# Patient Record
Sex: MALE | Race: OTHER | HISPANIC OR LATINO | Employment: FULL TIME | URBAN - METROPOLITAN AREA
[De-identification: names, ages, dates, MRNs, and addresses within clinical notes are randomized per-mention and may not be internally consistent; named-entity substitution may affect disease eponyms.]

---

## 2019-11-22 ENCOUNTER — APPOINTMENT (EMERGENCY)
Dept: CT IMAGING | Facility: HOSPITAL | Age: 29
End: 2019-11-22
Payer: COMMERCIAL

## 2019-11-22 ENCOUNTER — HOSPITAL ENCOUNTER (EMERGENCY)
Facility: HOSPITAL | Age: 29
Discharge: HOME | End: 2019-11-22
Attending: EMERGENCY MEDICINE
Payer: COMMERCIAL

## 2019-11-22 VITALS
WEIGHT: 220.1 LBS | DIASTOLIC BLOOD PRESSURE: 89 MMHG | SYSTOLIC BLOOD PRESSURE: 148 MMHG | OXYGEN SATURATION: 99 % | RESPIRATION RATE: 19 BRPM | TEMPERATURE: 98.4 F | HEART RATE: 84 BPM

## 2019-11-22 DIAGNOSIS — M25.552 ACUTE HIP PAIN, LEFT: ICD-10-CM

## 2019-11-22 DIAGNOSIS — G89.29 ACUTE EXACERBATION OF CHRONIC LOW BACK PAIN: ICD-10-CM

## 2019-11-22 DIAGNOSIS — M54.50 ACUTE EXACERBATION OF CHRONIC LOW BACK PAIN: ICD-10-CM
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
## 2019-11-22 PROCEDURE — 73700 CT LOWER EXTREMITY W/O DYE: CPT

## 2019-11-22 PROCEDURE — 99284 EMERGENCY DEPT VISIT MOD MDM: CPT | Performed by: PHYSICIAN ASSISTANT

## 2019-11-22 PROCEDURE — 96372 THER/PROPH/DIAG INJ SC/IM: CPT

## 2019-11-22 PROCEDURE — 72131 CT LUMBAR SPINE W/O DYE: CPT

## 2019-11-22 PROCEDURE — 99284 EMERGENCY DEPT VISIT MOD MDM: CPT

## 2019-11-22 RX ORDER — ACETAMINOPHEN 325 MG/1
650 TABLET ORAL ONCE
Status: COMPLETED | OUTPATIENT
Start: 2019-11-22 | End: 2019-11-22

## 2019-11-22 RX ORDER — KETOROLAC TROMETHAMINE 30 MG/ML
15 INJECTION, SOLUTION INTRAMUSCULAR; INTRAVENOUS ONCE
Status: COMPLETED | OUTPATIENT
Start: 2019-11-22 | End: 2019-11-22

## 2019-11-22 RX ORDER — IBUPROFEN 400 MG/1
400 TABLET ORAL EVERY 6 HOURS PRN
Qty: 20 TABLET | Refills: 0 | Status: SHIPPED | OUTPATIENT
Start: 2019-11-22

## 2019-11-22 RX ORDER — METHOCARBAMOL 500 MG/1
500 TABLET, FILM COATED ORAL ONCE
Status: COMPLETED | OUTPATIENT
Start: 2019-11-22 | End: 2019-11-22

## 2019-11-22 RX ORDER — LIDOCAINE 50 MG/G
1 PATCH TOPICAL ONCE
Status: DISCONTINUED | OUTPATIENT
Start: 2019-11-22 | End: 2019-11-22 | Stop reason: HOSPADM

## 2019-11-22 RX ORDER — METHOCARBAMOL 500 MG/1
500 TABLET, FILM COATED ORAL 2 TIMES DAILY
Qty: 20 TABLET | Refills: 0 | Status: SHIPPED | OUTPATIENT
Start: 2019-11-22

## 2019-11-22 RX ORDER — ACETAMINOPHEN 500 MG
500 TABLET ORAL EVERY 6 HOURS PRN
Qty: 30 TABLET | Refills: 0 | Status: SHIPPED | OUTPATIENT
Start: 2019-11-22

## 2019-11-22 RX ADMIN — KETOROLAC TROMETHAMINE 15 MG: 30 INJECTION, SOLUTION INTRAMUSCULAR; INTRAVENOUS at 17:43

## 2019-11-22 RX ADMIN — LIDOCAINE 1 PATCH: 50 PATCH TOPICAL at 18:18

## 2019-11-22 RX ADMIN — METHOCARBAMOL TABLETS 500 MG: 500 TABLET, COATED ORAL at 17:43

## 2019-11-22 RX ADMIN — ACETAMINOPHEN 650 MG: 325 TABLET ORAL at 17:43

## 2019-11-22 NOTE — ED PROVIDER NOTES
History  Chief Complaint   Patient presents with    Back Pain     Stepped wrong going down stairs yesterday  Left sided back pain down LLE  Denies numbness/tingling  States he did not fall, body twisted  Usually has pain, "but it is 10x's worse than normal"  Took ibuprofen at 0930, no relief  Limping from triage to room  Patient is a 79-year-old male presents today with chief complaint of low back pain which radiates into his left hip  Patient reports he fell down multiple stairs earlier today  Patient denies any head or neck pain or head trauma and notes he is not on any blood thinning medications  Patient reports he was seeing a specialist in [place] and was told that he may have curvature of the spine or potential disc problems however reports he is unsure of the previous imaging results as he moved to [facility]  History provided by:  Patient   used: No    Back Pain   Location:  Lumbar spine  Quality:  Aching  Radiates to:  L thigh  Pain severity:  Severe  Pain is:  Same all the time  Onset quality:  Gradual  Duration:  6 hours  Timing:  Constant  Progression:  Worsening  Chronicity:  New  Context: recent injury    Relieved by:  None tried  Worsened by:  Nothing  Ineffective treatments:  None tried  Associated symptoms: no abdominal pain, no chest pain, no dysuria, no fever and no numbness        None       History reviewed  No pertinent past medical history  History reviewed  No pertinent surgical history  History reviewed  No pertinent family history  I have reviewed and agree with the history as documented  Social History     Tobacco Use    Smoking status: Never Smoker    Smokeless tobacco: Never Used   Substance Use Topics    Alcohol use: Not Currently    Drug use: Never        Review of Systems   Constitutional: Negative for chills, fatigue and fever  HENT: Negative for congestion, ear pain, rhinorrhea and sore throat  Eyes: Negative for redness  Respiratory: Negative for chest tightness and shortness of breath  Cardiovascular: Negative for chest pain and palpitations  Gastrointestinal: Negative for abdominal pain, nausea and vomiting  Genitourinary: Negative for dysuria and hematuria  Musculoskeletal: Positive for arthralgias and back pain  Skin: Negative for rash  Neurological: Negative for dizziness, syncope, light-headedness and numbness  Physical Exam  Physical Exam   Constitutional: He is oriented to person, place, and time  He appears well-developed and well-nourished  HENT:   Head: Normocephalic and atraumatic  Eyes: Pupils are equal, round, and reactive to light  Neck: Normal range of motion  Cardiovascular: Normal rate, regular rhythm and normal heart sounds  Pulmonary/Chest: Effort normal and breath sounds normal    Abdominal: Soft  There is no tenderness  There is no guarding  Musculoskeletal: He exhibits tenderness  He exhibits no edema or deformity  Back:    Lymphadenopathy:     He has no cervical adenopathy  Neurological: He is alert and oriented to person, place, and time  Skin: Skin is warm and dry  Capillary refill takes less than 2 seconds  Psychiatric: He has a normal mood and affect  Nursing note and vitals reviewed        Vital Signs  ED Triage Vitals [11/22/19 1726]   Temperature Pulse Respirations Blood Pressure SpO2   98 4 °F (36 9 °C) 84 19 148/89 99 %      Temp Source Heart Rate Source Patient Position - Orthostatic VS BP Location FiO2 (%)   Tympanic Monitor Sitting Left arm --      Pain Score       Worst Possible Pain           Vitals:    11/22/19 1726   BP: 148/89   Pulse: 84   Patient Position - Orthostatic VS: Sitting         Visual Acuity      ED Medications  Medications   lidocaine (LIDODERM) 5 % patch 1 patch (1 patch Topical Medication Applied 11/22/19 1818)   ketorolac (TORADOL) injection 15 mg (15 mg Intramuscular Given 11/22/19 1743)   acetaminophen (TYLENOL) tablet 650 mg (650 mg Oral Given 11/22/19 1743)   methocarbamol (ROBAXIN) tablet 500 mg (500 mg Oral Given 11/22/19 1743)       Diagnostic Studies  Results Reviewed     None                 CT spine lumbar without contrast   Final Result by Lu Jasso MD (11/22 1851)      Mild multilevel degenerative facet arthrosis      No evidence of disc herniation, central canal or foraminal stenosis      No acute lumbosacral sacral pathology identified            Workstation performed: [id number]         CT hip left without contrast   Final Result by Lu Jasso MD (11/22 1849)      Moderate degenerative arthritis left hip      No evidence of acute fracture            Workstation performed: [id number]                    Procedures  Procedures       ED Course  ED Course as of Nov 22 1915 Fri Nov 22, 2019   1854    Mild multilevel degenerative facet arthrosis     No evidence of disc herniation, central canal or foraminal stenosis     No acute lumbosacral sacral pathology identified         1854 IMPRESSION:     Moderate degenerative arthritis left hip     No evidence of acute fracture                                        MDM  Number of Diagnoses or Management Options  Acute exacerbation of chronic low back pain:   Acute hip pain, left:   Fall, in, on, stairs, initial encounter:   Diagnosis management comments: Denies history of severe or worsening lower extremity weakness/numbness  Denies history of saddle anesthesia/perineal anesthesia  Denies bowel or bladder incontinence/retention   History does not suggest diagnosis of cauda equina syndrome   Patient denies history of IVDA, denies history of fevers, no recent surgeries or any procedures to suggest a transient bacteremia leading to a diagnosis of subdural abscess  Denies history of blood thinner use with recent history of lumbar puncture or any violation of the epidural space to suggest history of epidural hematoma   Therefore these above diagnoses (cauda equina syndrome, epidural abscess, epidural hematoma) were not pursued with diagnostic imaging  Due to acute trauma       Disposition  Final diagnoses:   Fall, in, on, stairs, initial encounter   Acute exacerbation of chronic low back pain   Acute hip pain, left     Time reflects when diagnosis was documented in both MDM as applicable and the Disposition within this note     Time User Action Codes Description Comment    11/22/2019  6:55 PM Marino Mckeon [W10  9XXA] Fall, in, on, stairs, initial encounter     11/22/2019  6:56 PM Cindy Nirmala Add [M54 5,  G89 29] Acute exacerbation of chronic low back pain     11/22/2019  6:57 PM Cindy Nirmala Add [M25 552] Acute hip pain, left       ED Disposition     ED Disposition Condition Date/Time Comment    Discharge Good Fri Nov 22, 2019  6:55 PM Cesar Howell discharge to home/self care  Follow-up Information     Follow up With Specialties Details Why Contact Info Additional Information    [facility] Therapy Schedule an appointment as soon as possible for a visit  As needed           Discharge Medication List as of 11/22/2019  6:57 PM      START taking these medications    Details   acetaminophen (TYLENOL) 500 mg tablet Take 1 tablet (500 mg total) by mouth every 6 (six) hours as needed for mild pain, Starting Fri 11/22/2019, Print      ibuprofen (MOTRIN) 400 mg tablet Take 1 tablet (400 mg total) by mouth every 6 (six) hours as needed for moderate pain, Starting Fri 11/22/2019, Print      methocarbamol (ROBAXIN) 500 mg tablet Take 1 tablet (500 mg total) by mouth 2 (two) times a day, Starting Fri 11/22/2019, Print           No discharge procedures on file      ED Provider  Electronically Signed by           Marjan Chapa PA-C  11/22/19 Stevie Rose PA-C  11/22/19 0060

## 2019-11-23 NOTE — ED ATTENDING ATTESTATION
I was the attending physician on duty at the time the patient visited the emergency department  The patient was evaluated and dispositioned by the APC  I was personally available for consultation  I am administratively signing the chart after the fact      Pal Lopez MD

## 2022-10-08 ENCOUNTER — APPOINTMENT (OUTPATIENT)
Dept: URGENT CARE | Facility: MEDICAL CENTER | Age: 32
End: 2022-10-08
Payer: OTHER MISCELLANEOUS

## 2022-10-08 PROCEDURE — 99284 EMERGENCY DEPT VISIT MOD MDM: CPT | Performed by: FAMILY MEDICINE

## 2022-10-08 PROCEDURE — G0383 LEV 4 HOSP TYPE B ED VISIT: HCPCS | Performed by: FAMILY MEDICINE

## 2022-10-13 ENCOUNTER — APPOINTMENT (OUTPATIENT)
Dept: URGENT CARE | Facility: MEDICAL CENTER | Age: 32
End: 2022-10-13
Payer: OTHER MISCELLANEOUS

## 2022-10-13 PROCEDURE — 99213 OFFICE O/P EST LOW 20 MIN: CPT | Performed by: FAMILY MEDICINE

## 2022-10-31 ENCOUNTER — APPOINTMENT (OUTPATIENT)
Dept: URGENT CARE | Facility: MEDICAL CENTER | Age: 32
End: 2022-10-31

## 2023-03-02 ENCOUNTER — OFFICE VISIT (OUTPATIENT)
Dept: DENTISTRY | Facility: CLINIC | Age: 33
End: 2023-03-02

## 2023-03-02 VITALS — SYSTOLIC BLOOD PRESSURE: 126 MMHG | DIASTOLIC BLOOD PRESSURE: 81 MMHG | HEART RATE: 100 BPM | TEMPERATURE: 97.8 F

## 2023-03-02 DIAGNOSIS — Z01.20 ENCOUNTER FOR DENTAL EXAMINATION: Primary | ICD-10-CM

## 2023-03-02 PROBLEM — K05.30 PERIODONTITIS: Status: ACTIVE | Noted: 2023-03-02

## 2023-03-02 NOTE — DENTAL PROCEDURE DETAILS
Malarocio Stanton presents for a Comprehensive exam  Verbal consent for treatment given in addition to the forms  Reviewed health history - Patient is ASA I  Consents signed: Yes     Perio: Normal, Localized #sextant 5 , and Gingival inflammation # 82,16,19,45 anterior crowding (pre-molar light sub calculus)   Pain Scale: 0  Caries Assessment: Medium  Radiographs: Complete mouth series  Stage 1 Grade B      Oral Hygiene instruction reviewed and given  Recommended Hygiene recall visits with the Kemal Renata  Its been many years since he's seen a dentist anterior crowding w/moderate supra calculus, and facial margin #3 & #14, patient is missing #9 from basketball accident many years ago and is interested in upper partial      Treatment Plan:  1  Infection control:   2  Periodontal therapy: adult prophy  3  Caries control: as charted  4  Occlusal evaluation: Pre-auth partial denture to replace #9   5  Case Difficulty Type 1  Prognosis is Good  Referrals needed:  To [facility] for extraction #1 & #16 after prophy appointment   Next Visit:  Prophy   Exam by Dr Eve Delatorre

## 2023-03-09 ENCOUNTER — OFFICE VISIT (OUTPATIENT)
Dept: DENTISTRY | Facility: CLINIC | Age: 33
End: 2023-03-09

## 2023-03-09 VITALS — HEART RATE: 98 BPM | DIASTOLIC BLOOD PRESSURE: 86 MMHG | SYSTOLIC BLOOD PRESSURE: 125 MMHG | TEMPERATURE: 98.3 F

## 2023-03-09 DIAGNOSIS — Z01.20 ENCOUNTER FOR DENTAL EXAMINATION: Primary | ICD-10-CM

## 2023-03-09 NOTE — DENTAL PROCEDURE DETAILS
Prophylaxis completed with ultrasonic  and hand instrumentation  Soft plaque removed and supragingival calculus removed from entire dentition,  especially sextant 5 & facial margins #3 & #14 patient has anterior crown   Polished with prophy cup and paste  Flossed and provided Oral Health Instructions  Demonstrated proper brushing and flossing technique  Patient left satisfied and ambulatory      NV: 1 Restorative #12 & #15 Occlusal   NV: 2 [address]     No Exam today

## 2023-03-24 ENCOUNTER — APPOINTMENT (OUTPATIENT)
Dept: URGENT CARE | Facility: MEDICAL CENTER | Age: 33
End: 2023-03-24

## 2023-04-06 ENCOUNTER — APPOINTMENT (OUTPATIENT)
Dept: RADIOLOGY | Facility: MEDICAL CENTER | Age: 33
End: 2023-04-06

## 2023-04-06 DIAGNOSIS — M25.552 LEFT HIP PAIN: ICD-10-CM

## 2023-06-07 ENCOUNTER — OFFICE VISIT (OUTPATIENT)
Dept: DENTISTRY | Facility: CLINIC | Age: 33
End: 2023-06-07

## 2023-06-07 VITALS — HEART RATE: 87 BPM | TEMPERATURE: 98 F | DIASTOLIC BLOOD PRESSURE: 91 MMHG | SYSTOLIC BLOOD PRESSURE: 137 MMHG

## 2023-06-07 DIAGNOSIS — K02.9 DENTAL CARIES: Primary | ICD-10-CM

## 2023-06-07 PROCEDURE — D2391 RESIN-BASED COMPOSITE - 1 SURFACE, POSTERIOR: HCPCS | Performed by: DENTIST

## 2023-06-07 NOTE — DENTAL PROCEDURE DETAILS
Patient due for next hygiene recall Sept 2023  [facility], [facility], ASA 1 - Normal health patient  Patient reports pain level of 0  Patient presents for restorative treatment #12-O, 15-O   EOE WNL  IOE shows no swelling or sinus tracts  Anesthesia: 0 75 carpules Articaine, 4% with Epinephrine 1:100,000, given via buccal Infiltration  Isolation: Cotton roll isolation achieved  Tx:  Primary caries removed  No matrix used  Selective etched for 12 seconds with 37% phosphoric acid and rinsed, Ivoclar Adhese Universal bond placed with AVOS SystemsaPen 20 second scrub, air dried for 5 seconds and light cured, and restored with Tetric Evoflow and Evoceram composite shade A2  Occlusion checked with articulation paper and Margins checked with explorer  Adjusted as needed  Finished and polished  Patient satisfied and dismissed alert and ambulatory  Behavior ++, very good for injection      NV: Restorative #18-O

## 2023-06-08 ENCOUNTER — OFFICE VISIT (OUTPATIENT)
Dept: DENTISTRY | Facility: CLINIC | Age: 33
End: 2023-06-08

## 2023-06-08 VITALS — DIASTOLIC BLOOD PRESSURE: 91 MMHG | SYSTOLIC BLOOD PRESSURE: 139 MMHG | HEART RATE: 85 BPM | TEMPERATURE: 98 F

## 2023-06-08 DIAGNOSIS — K02.9 DENTAL CARIES: Primary | ICD-10-CM

## 2023-06-08 PROCEDURE — D2391 RESIN-BASED COMPOSITE - 1 SURFACE, POSTERIOR: HCPCS | Performed by: DENTIST

## 2023-06-08 NOTE — DENTAL PROCEDURE DETAILS
Patient due for next hygiene recall Sept 2023  [facility], [facility], ASA 1 - Normal health patient  Patient reports pain level of 0  Patient presents for restorative treatment #31-O   EOE WNL  IOE shows no swelling or sinus tracts  Anesthesia: 1 0 carpule Lidocaine HCL, 2% with Epinephrine 1:100,000, given via IANB  Isolation: Size Medium Dryshield Isolation achieved  Tx:  Primary caries removed, decay deeper than expected based on radiograph in mesial portion of occlusal prep  No matrix used  Selective etched for 12 seconds with 37% phosphoric acid and rinsed, Hema-Glu desensitizer applied with microbrush for 30 seconds then rinsed and lightly air dried, Ivoclar Adhese Universal bond placed with VivaPen 20 second scrub, air dried for 5 seconds and light cured, and restored with Tetric Evoceram composite shade A2  Occlusion checked with articulation paper and Margins checked with explorer  Adjusted as needed  Finished and polished  Patient satisfied and dismissed alert and ambulatory  Behavior ++, very good for injection  NV: Restorative and discuss plan for 3rd molars

## 2023-06-13 ENCOUNTER — OFFICE VISIT (OUTPATIENT)
Dept: DENTISTRY | Facility: CLINIC | Age: 33
End: 2023-06-13

## 2023-06-13 VITALS — HEART RATE: 86 BPM | DIASTOLIC BLOOD PRESSURE: 92 MMHG | SYSTOLIC BLOOD PRESSURE: 137 MMHG

## 2023-06-13 DIAGNOSIS — Z01.21 ENCOUNTER FOR DENTAL EXAMINATION AND CLEANING WITH ABNORMAL FINDINGS: Primary | ICD-10-CM

## 2023-06-13 PROCEDURE — D2392 RESIN-BASED COMPOSITE - 2 SURFACES, POSTERIOR: HCPCS | Performed by: DENTIST

## 2023-06-13 NOTE — PROGRESS NOTES
Composite Filling    Aba Reyes presents for composite filling  PMH reviewed, no changes  Discussed with patient need for RCT if pulp exposure occurs or in future if pulp is inflamed  Pt understands and consents  Prepped tooth # 18 (OB), no anesthesia needed  with 245 carbide on high speed  Caries removed with round carbide on slow speed    Isolation with cotton rolls and dri-angles    Etch with 37% H2PO4, rinse, dry  Applied Adhese with 20 second scrub once, gentle air dry and light cured for 10s  Restored with Tetric bulk tomeka shade A2 and light cured  Refined with finishing burs, polished with enhance point  Verified occlusion and contacts  post op instructions given  Pt left satisfied  NV : discussing and treatment planning of the missing upper anterior tooth # 9

## 2023-06-13 NOTE — DENTAL PROCEDURE DETAILS
Patient presents for a dental restoration and verbally consents for treatment:  Reviewed health history-  Pt is ASA type II  Treatment consents signed: Yes  Perio: Periodontitis  Pain Scale: 0  Caries Assessment: Medium    Radiographs: Films are current  Oral Hygiene instruction reviewed and given  Hygiene recall visits recommended to the patient    Patient agrees with the diagnosis of Caries and the proposed treatment plan for the resin restoration:  Tooth ##18  Dental Anesthesia:  No  Material:   Etch Ivoclar bond and resin   Shade: Shade A2    Prognosis is Good  Referrals Needed: No  Next visit: treatment planning for missing Up  Ant  tooth

## 2023-07-12 ENCOUNTER — OFFICE VISIT (OUTPATIENT)
Dept: DENTISTRY | Facility: CLINIC | Age: 33
End: 2023-07-12

## 2023-07-12 VITALS — SYSTOLIC BLOOD PRESSURE: 128 MMHG | TEMPERATURE: 98 F | DIASTOLIC BLOOD PRESSURE: 88 MMHG | HEART RATE: 91 BPM

## 2023-07-12 DIAGNOSIS — K08.409 TOOTH MISSING: Primary | ICD-10-CM

## 2023-07-12 PROCEDURE — WIS5000 PRELIMINARY IMPRESSIONS: Performed by: DENTIST

## 2023-07-12 NOTE — DENTAL PROCEDURE DETAILS
Patient presents for starting procedure of removable maxillary acrylic base partial denture to replace missing tooth #9. Patient consent treatment verbally. Reviewed medical history, no changes. ASA: I  Pain score: 0    Reviewed and discussed with patient expectations and steps for denture procedures. Patient aware, understood and approved. Patient reported he might think to have an implant in future. Upper and lower preliminary impressions for diagnostic models. Postop instruction is given to patient. Patient walked out comfortable and ambulatory. NV: Final Impression of upper acrylic partial denture/tooth shade selection.

## 2023-08-02 ENCOUNTER — OFFICE VISIT (OUTPATIENT)
Dept: DENTISTRY | Facility: CLINIC | Age: 33
End: 2023-08-02

## 2023-08-02 VITALS — HEART RATE: 91 BPM | DIASTOLIC BLOOD PRESSURE: 81 MMHG | TEMPERATURE: 97.6 F | SYSTOLIC BLOOD PRESSURE: 115 MMHG

## 2023-08-02 DIAGNOSIS — K08.409 TOOTH MISSING: Primary | ICD-10-CM

## 2023-08-02 PROCEDURE — WIS5001 FINAL IMPRESSIONS DENTURE: Performed by: DENTIST

## 2023-08-02 NOTE — DENTAL PROCEDURE DETAILS
Pt presents for a denture visit and gave verbal consent for treatment:    Reviewed health history - no changes. Pt is ASA Class I    Pain Scale: 0    Chief complain: "I need partial to replace my missing front tooth". Caries Assessment: Low    Radiographs: Films are current    Oral Hygiene instruction reviewed    Routine prophy visits recommended to the pt. Explained denture making process to patient, including the necessary number of visits and timeframe to make denture. Reviewed denture expectations, adjustment period, and hygiene. Tried custom tray, border molding and final impression is taken for maxillary acrylic base partial denture using PVS medium and light body. Bite registration is recorded with blue print. Tooth shade is A2 and gum shade is standard, patient approved. Work is sent to lab with lower diagnostic model from last visit for articulation and wax teeth set up.     NV: Try in wax teeth set up of upper acrylic base partial denture

## 2023-08-18 ENCOUNTER — OFFICE VISIT (OUTPATIENT)
Dept: DENTISTRY | Facility: CLINIC | Age: 33
End: 2023-08-18

## 2023-08-18 VITALS — DIASTOLIC BLOOD PRESSURE: 90 MMHG | HEART RATE: 88 BPM | SYSTOLIC BLOOD PRESSURE: 127 MMHG

## 2023-08-18 DIAGNOSIS — K08.109 MISSING TEETH, ACQUIRED: Primary | ICD-10-CM

## 2023-08-18 PROCEDURE — WIS5003 WAX TRY-IN

## 2023-08-18 NOTE — DENTAL PROCEDURE DETAILS
Patient presents for a wax try-in and verbally consents for treatment:  Reviewed health history-  Pt is ASA type I  Treatment consents signed: Yes  Perio: Healthy  Pain Scale: 0  Caries Assessment: Medium    Radiographs: Films are current  Oral Hygiene instruction reviewed and given  Hygiene recall visits recommended to the patient    Tooth set up tried intraorally. Confirmed proper tissue support and occlusion. Pt confirmed satisfaction with function and esthetics via pt mirror. Selected gingival shade (standard) and confirmed by pt. Not fully seating in arch. In lab slip, asked lab to add wrought wire clasps around the buccal of 3 and 14 to increase retention. Pt left satisfied and ambulatory.      NV: delivery  Attending: Dr. Antonia Roberts

## 2023-09-13 ENCOUNTER — OFFICE VISIT (OUTPATIENT)
Dept: DENTISTRY | Facility: CLINIC | Age: 33
End: 2023-09-13

## 2023-09-13 VITALS — HEART RATE: 84 BPM | SYSTOLIC BLOOD PRESSURE: 135 MMHG | DIASTOLIC BLOOD PRESSURE: 92 MMHG

## 2023-09-13 DIAGNOSIS — Z01.20 ENCOUNTER FOR DENTAL EXAMINATION: Primary | ICD-10-CM

## 2023-09-13 PROCEDURE — D0120 PERIODIC ORAL EVALUATION - ESTABLISHED PATIENT: HCPCS

## 2023-09-13 PROCEDURE — D1110 PROPHYLAXIS - ADULT: HCPCS

## 2023-09-13 NOTE — DENTAL PROCEDURE DETAILS
Eric Barron presents for a Periodic exam. Verbal consent for treatment given in addition to the forms. Reviewed health history - Patient is ASA I  Consents signed: Yes     Perio: Normal  Pain Scale: 0  Caries Assessment: Low  Radiographs: None     Oral Hygiene instruction reviewed and given. Recommended Hygiene recall visits with the Robe Kaur. Patient presents for [facility], no chief complaints today #9 max partial denture is in the process. Moderate facial calculus #3 & #14 used cavitron , w/moderate bleeding. Treatment Plan:  1. Infection control:   2. Adult Prophy   3. Caries control: as charted  4. Occlusal evaluation:   5. Case Difficulty Type 1  Prognosis is Good.   Referrals needed: No  Next Visit: Follow up w/max partial denture process  NV: 2 [facility] w/BW   Exam by Dr. Breezy Ge

## 2023-09-18 ENCOUNTER — OFFICE VISIT (OUTPATIENT)
Dept: DENTISTRY | Facility: CLINIC | Age: 33
End: 2023-09-18

## 2023-09-18 VITALS — SYSTOLIC BLOOD PRESSURE: 140 MMHG | TEMPERATURE: 98.6 F | DIASTOLIC BLOOD PRESSURE: 99 MMHG | HEART RATE: 77 BPM

## 2023-09-18 DIAGNOSIS — K08.409 TOOTH MISSING: Primary | ICD-10-CM

## 2023-09-18 PROCEDURE — D5211 MAXILLARY PARTIAL DENTURE - RESIN BASE (INCLUDING, RETENTIVE/CLASPING MATERIALS, RESTS, AND TEETH): HCPCS | Performed by: DENTIST

## 2023-09-18 NOTE — DENTAL PROCEDURE DETAILS
Delivery of maxillary Acrylic Base partial Denture    Patient Parmar Tyra) presents for delivery of maxillary Acrylic Base partial Denture. Patient consent treatment. RMH, no changes. ASA: I  Pain score: 0  Chief complain: none. Procedure: Upper acrylic base partial denture is delivered to patient. Verified seating, retention, stability, occlusion, phonetics and esthetics. Patient tried it by himself and approved. Demonstrated how to clean and maintain the denture. Denture care kit is given to patient. Post insertion instruction is given to patient. Patient is very happy, satisfied and left ambulatory. NV: Post insertion follow up.